# Patient Record
Sex: FEMALE | Race: WHITE | NOT HISPANIC OR LATINO | URBAN - NONMETROPOLITAN AREA
[De-identification: names, ages, dates, MRNs, and addresses within clinical notes are randomized per-mention and may not be internally consistent; named-entity substitution may affect disease eponyms.]

---

## 2018-06-02 ENCOUNTER — APPOINTMENT (OUTPATIENT)
Dept: GENERAL RADIOLOGY | Facility: HOSPITAL | Age: 28
End: 2018-06-02

## 2018-06-02 ENCOUNTER — HOSPITAL ENCOUNTER (EMERGENCY)
Facility: HOSPITAL | Age: 28
Discharge: HOME | End: 2018-06-02
Admitting: EMERGENCY MEDICINE

## 2018-06-02 VITALS
HEIGHT: 61 IN | BODY MASS INDEX: 21.71 KG/M2 | OXYGEN SATURATION: 99 % | WEIGHT: 115 LBS | TEMPERATURE: 97.8 F | SYSTOLIC BLOOD PRESSURE: 135 MMHG | RESPIRATION RATE: 16 BRPM | DIASTOLIC BLOOD PRESSURE: 82 MMHG | HEART RATE: 76 BPM

## 2018-06-02 DIAGNOSIS — M25.571 ACUTE RIGHT ANKLE PAIN: ICD-10-CM

## 2018-06-02 DIAGNOSIS — M25.562 ACUTE PAIN OF LEFT KNEE: Primary | ICD-10-CM

## 2018-06-02 DIAGNOSIS — M25.552 LEFT HIP PAIN: ICD-10-CM

## 2018-06-02 PROCEDURE — 96374 THER/PROPH/DIAG INJ IV PUSH: CPT

## 2018-06-02 PROCEDURE — 73523 X-RAY EXAM HIPS BI 5/> VIEWS: CPT

## 2018-06-02 PROCEDURE — 25010000002 HYDROMORPHONE PER 4 MG: Performed by: EMERGENCY MEDICINE

## 2018-06-02 PROCEDURE — 99283 EMERGENCY DEPT VISIT LOW MDM: CPT

## 2018-06-02 PROCEDURE — 25010000002 ONDANSETRON PER 1 MG: Performed by: PHYSICIAN ASSISTANT

## 2018-06-02 PROCEDURE — 96375 TX/PRO/DX INJ NEW DRUG ADDON: CPT

## 2018-06-02 PROCEDURE — 73560 X-RAY EXAM OF KNEE 1 OR 2: CPT

## 2018-06-02 RX ORDER — HYDROCODONE BITARTRATE AND ACETAMINOPHEN 7.5; 325 MG/1; MG/1
1 TABLET ORAL ONCE
Status: COMPLETED | OUTPATIENT
Start: 2018-06-02 | End: 2018-06-02

## 2018-06-02 RX ORDER — ONDANSETRON 2 MG/ML
4 INJECTION INTRAMUSCULAR; INTRAVENOUS ONCE
Status: COMPLETED | OUTPATIENT
Start: 2018-06-02 | End: 2018-06-02

## 2018-06-02 RX ORDER — OXYCODONE AND ACETAMINOPHEN 7.5; 325 MG/1; MG/1
1 TABLET ORAL EVERY 6 HOURS PRN
Qty: 12 TABLET | Refills: 0 | Status: SHIPPED | OUTPATIENT
Start: 2018-06-02 | End: 2018-06-14

## 2018-06-02 RX ORDER — HYDROMORPHONE HYDROCHLORIDE 1 MG/ML
1 INJECTION, SOLUTION INTRAMUSCULAR; INTRAVENOUS; SUBCUTANEOUS ONCE
Status: COMPLETED | OUTPATIENT
Start: 2018-06-02 | End: 2018-06-02

## 2018-06-02 RX ADMIN — HYDROCODONE BITARTRATE AND ACETAMINOPHEN 1 TABLET: 7.5; 325 TABLET ORAL at 18:22

## 2018-06-02 RX ADMIN — HYDROMORPHONE HYDROCHLORIDE 1 MG: 1 INJECTION, SOLUTION INTRAMUSCULAR; INTRAVENOUS; SUBCUTANEOUS at 16:39

## 2018-06-02 RX ADMIN — ONDANSETRON 4 MG: 2 INJECTION, SOLUTION INTRAMUSCULAR; INTRAVENOUS at 16:39

## 2018-06-02 NOTE — ED PROVIDER NOTES
"Subjective   28-year-old female presents to ER after falling at the [employer] [place] prior to arrival.  Patient states she was jumping from one trampoline to another when she rolled her right ankle and then fell on her left leg.  Patient states her left knee \"popped out of place\" and she quickly pushed it back in.  Patient also reports left hip pain and states she thinks it is also \"dislocated.\"  Patient rates pain as severe.  Patient states pain is worse when she tries to move or any attempt at ambulation.  Patient has jumped on her right leg for weightbearing but has not been able to weight-bear on her left leg.  Patient describes pain as shooting from left hip and down her leg.  Patient denies hitting her head, loss of consciousness, syncope.        History provided by:  Patient   used: No        Review of Systems   Constitutional: Negative for chills and fever.   HENT: Negative for congestion and sore throat.    Respiratory: Negative for cough and shortness of breath.    Cardiovascular: Negative for chest pain and palpitations.   Gastrointestinal: Negative for abdominal pain, nausea and vomiting.   Genitourinary: Negative for dysuria and hematuria.   Musculoskeletal: Positive for arthralgias (left hip, left knee, right ankle). Negative for neck pain.   Skin: Negative for rash and wound.   Neurological: Negative for dizziness, weakness and headaches.   Hematological: Negative for adenopathy. Does not bruise/bleed easily.       History reviewed. No pertinent past medical history.    No Known Allergies    History reviewed. No pertinent surgical history.    No family history on file.    Social History     Social History   • Marital status:      Social History Main Topics   • Drug use: Unknown     Other Topics Concern   • Not on file       Lab Results (last 24 hours)     ** No results found for the last 24 hours. **          Objective   Physical Exam   Constitutional: She is oriented " to person, place, and time. She appears well-developed and well-nourished. Distressed: in pain.   HENT:   Head: Normocephalic and atraumatic.   Right Ear: External ear normal.   Left Ear: External ear normal.   Neck: Normal range of motion. Neck supple.   Cardiovascular: Normal rate, regular rhythm and normal heart sounds.    Pulmonary/Chest: Effort normal and breath sounds normal. No respiratory distress. She has no wheezes.   Musculoskeletal:        Left hip: She exhibits decreased range of motion and tenderness. She exhibits no swelling, no deformity and no laceration.        Left knee: She exhibits decreased range of motion and swelling. She exhibits no erythema. Tenderness found.        Right ankle: She exhibits normal range of motion, no swelling, no ecchymosis, no deformity, no laceration and normal pulse. Tenderness. AITFL tenderness found.   Left leg in externally rotated but not shortened. +DP pulses to palpation.     Left knee: swelling on medial aspect distal to joint line.     Right ankle minimal tenderness in the AITF L region.  Patient is able to hop on this foot.  [place] ankle negative.        Neurological: She is alert and oriented to person, place, and time.   Skin: Skin is warm and dry. Capillary refill takes less than 2 seconds. She is not diaphoretic.   Psychiatric: She has a normal mood and affect. Her behavior is normal.   Nursing note and vitals reviewed.      Procedures         XR Hips Bilateral With or Without Pelvis 5 View   Final Result   . Normal exam of the hips and pelvis.   This report was finalized on 06/02/2018 17:22 by Dr. Peter Taylor MD.      XR Knee 1 or 2 View Left   Final Result   1.. A joint effusion as well as soft tissue swelling over the medial   joint line are present. If symptoms are persistent follow up with MRI   would be recommended to rule out internal derangement of the knee.   2. No evidence of acute fracture.   This report was finalized on 06/02/2018 17:20  "by Dr. Peter Taylor MD.          /82 (BP Location: Right arm, Patient Position: Lying)   Pulse 76   Temp 97.8 °F (36.6 °C) (Oral)   Resp 16   Ht 154.9 cm (61\")   Wt 52.2 kg (115 lb)   SpO2 99%   BMI 21.73 kg/m²     ED Course    ED Course as of Jun 02 1920   Sat Jun 02, 2018   2912 Discussed with Dr. Alvarez who states pt is okay for d/c and follow up with orthopedics for further evaluation. Will treat pain, ace bandage, crutches. Discussed with Dr. Alvarez who does not recommend workup for popliteal artery rupture at this time.   [CP]      ED Course User Index  [CP] Eldon Arenas PA-C       Medications   HYDROmorphone (DILAUDID) injection 1 mg (1 mg Intravenous Given 6/2/18 1639)   ondansetron (ZOFRAN) injection 4 mg (4 mg Intravenous Given 6/2/18 1639)   HYDROcodone-acetaminophen (NORCO) 7.5-325 MG per tablet 1 tablet (1 tablet Oral Given 6/2/18 1822)            MDM  Number of Diagnoses or Management Options  Acute pain of left knee: new and requires workup  Acute right ankle pain: new and requires workup  Left hip pain: new and requires workup  Diagnosis management comments: 28-year-old female presents to ER after falling [place] the [place] [employer] [place].  Patient complains of right ankle pain, left knee pain and left hip pain.  Right ankle is auto ankle negative.  She is able to bear weight and hop on right foot.  X-rays were negative of the left hip.  X-ray of the left knee shows medial effusion with soft tissue swelling along the medial joint line.  Distal pulses and sensation were intact.  Discussed with patient the possibility of needing an MRI on an outpatient basis or ligament or meniscal injury.  This was recommended from radiologist symptoms do not improve.  I discussed this with Dr. Alvarez who agrees with plan to discharge patient this time with Ace bandage, crutches, pain medication.  Patient will use rice method.  I had a lengthy discussion with patient regarding strict " precautions.  She will follow-up at the orth[facility] early next week.       Amount and/or Complexity of Data Reviewed  Tests in the radiology section of CPT®: reviewed and ordered    Risk of Complications, Morbidity, and/or Mortality  Presenting problems: moderate  Diagnostic procedures: moderate  Management options: moderate    Patient Progress  Patient progress: stable      Final diagnoses:   Acute pain of left knee   Left hip pain   Acute right ankle pain          Eldon Arenas PA-C  06/02/18 1992

## 2018-06-02 NOTE — DISCHARGE INSTRUCTIONS
Please return to the ER immediately if he developed any new, worsening, or concerning symptoms.  Please use the rice method that we discussed area please use her crutches as needed.  Please take the pain medication as directed.  Follow-up with the orth[facility] as directed with the number I gave you to call on Monday.